# Patient Record
Sex: MALE | Race: BLACK OR AFRICAN AMERICAN | NOT HISPANIC OR LATINO | Employment: FULL TIME | URBAN - METROPOLITAN AREA
[De-identification: names, ages, dates, MRNs, and addresses within clinical notes are randomized per-mention and may not be internally consistent; named-entity substitution may affect disease eponyms.]

---

## 2022-04-09 ENCOUNTER — HISTORICAL (OUTPATIENT)
Dept: ADMINISTRATIVE | Facility: HOSPITAL | Age: 22
End: 2022-04-09
Payer: COMMERCIAL

## 2022-04-25 VITALS
WEIGHT: 172.81 LBS | SYSTOLIC BLOOD PRESSURE: 114 MMHG | BODY MASS INDEX: 25.6 KG/M2 | HEIGHT: 69 IN | DIASTOLIC BLOOD PRESSURE: 76 MMHG | OXYGEN SATURATION: 98 %

## 2022-11-29 ENCOUNTER — OFFICE VISIT (OUTPATIENT)
Dept: URGENT CARE | Facility: CLINIC | Age: 22
End: 2022-11-29

## 2022-11-29 VITALS
HEART RATE: 60 BPM | BODY MASS INDEX: 25.8 KG/M2 | RESPIRATION RATE: 18 BRPM | HEIGHT: 69 IN | TEMPERATURE: 98 F | SYSTOLIC BLOOD PRESSURE: 125 MMHG | WEIGHT: 174.19 LBS | DIASTOLIC BLOOD PRESSURE: 75 MMHG | OXYGEN SATURATION: 98 %

## 2022-11-29 DIAGNOSIS — R00.2 HEART PALPITATIONS: Primary | ICD-10-CM

## 2022-11-29 PROCEDURE — 93010 ELECTROCARDIOGRAM REPORT: CPT | Mod: S$PBB,,,

## 2022-11-29 PROCEDURE — 99213 PR OFFICE/OUTPT VISIT, EST, LEVL III, 20-29 MIN: ICD-10-PCS | Mod: S$PBB,25,,

## 2022-11-29 PROCEDURE — 99215 OFFICE O/P EST HI 40 MIN: CPT | Mod: PBBFAC

## 2022-11-29 PROCEDURE — 93005 ELECTROCARDIOGRAM TRACING: CPT | Mod: PBBFAC

## 2022-11-29 PROCEDURE — 99213 OFFICE O/P EST LOW 20 MIN: CPT | Mod: S$PBB,25,,

## 2022-11-29 PROCEDURE — 93010 POCT EKG 12-LEAD: ICD-10-PCS | Mod: S$PBB,,,

## 2022-11-29 NOTE — PROGRESS NOTES
"Subjective:       Patient ID: Saad Weldon Jr. is a 22 y.o. male.    Vitals:  height is 5' 9.02" (1.753 m) and weight is 79 kg (174 lb 3.2 oz). His oral temperature is 97.8 °F (36.6 °C). His blood pressure is 125/75 and his pulse is 60. His respiration is 18 and oxygen saturation is 98%.     Chief Complaint: Heart Problem (Pt sts he's been having heart palpation. Sts he is not stressed. ), Leg Pain (Right calf area, pt sts been hurting the past couple of months. ), and Referral (For PCP )    PT states intermittent heart palpitations. States hx of similar palpitations in past but no follow up. Denies SOB or chest pain, just abnormal heart beat feeling.   Pt also states intermittent calf pain for months, however states that has resolved. States both palpitations and calf pain get better with exercise.  Does not have a PCP.    Heart Problem    Leg Pain     Constitution: Negative.   HENT: Negative.     Neck: neck negative.   Cardiovascular:  Positive for palpitations.   Eyes: Negative.    Respiratory: Negative.     Gastrointestinal: Negative.    Genitourinary: Negative.    Musculoskeletal: Negative.    Skin: Negative.    Neurological: Negative.      Objective:      Physical Exam   Constitutional: He is oriented to person, place, and time. normal  HENT:   Head: Normocephalic.   Nose: Nose normal.   Mouth/Throat: Uvula is midline, oropharynx is clear and moist and mucous membranes are normal. Mucous membranes are moist. Oropharynx is clear.   Eyes: Pupils are equal, round, and reactive to light.   Neck: Neck supple.   Cardiovascular: Regular rhythm, normal heart sounds and normal pulses. Bradycardia present.   Pulmonary/Chest: Effort normal and breath sounds normal.   Abdominal: Normal appearance. Soft.   Musculoskeletal: Normal range of motion.         General: Normal range of motion.   Neurological: He is alert and oriented to person, place, and time.   Skin: Skin is warm and dry.   Vitals reviewed.    "   Assessment:       1. Heart palpitations            Plan:         Heart palpitations  -     POCT EKG 12-LEAD (Manually Resulted by Ordering Provider)  -     Ambulatory referral/consult to Family Practice      EKG Sinus bradycardia. No ST changes or arrhythmias noted.     A referral placed for a PCP for further work up and evaluation.    ER precautions given, pt verbalized understanding.     Please see provided patient education for guidance.

## 2023-06-18 ENCOUNTER — OFFICE VISIT (OUTPATIENT)
Dept: URGENT CARE | Facility: CLINIC | Age: 23
End: 2023-06-18

## 2023-06-18 VITALS
HEIGHT: 69 IN | RESPIRATION RATE: 16 BRPM | TEMPERATURE: 98 F | SYSTOLIC BLOOD PRESSURE: 120 MMHG | HEART RATE: 94 BPM | WEIGHT: 171.5 LBS | OXYGEN SATURATION: 99 % | BODY MASS INDEX: 25.4 KG/M2 | DIASTOLIC BLOOD PRESSURE: 77 MMHG

## 2023-06-18 DIAGNOSIS — Q38.6 FORDYCE SPOTS: Primary | ICD-10-CM

## 2023-06-18 PROCEDURE — 99213 PR OFFICE/OUTPT VISIT, EST, LEVL III, 20-29 MIN: ICD-10-PCS | Mod: S$PBB,,, | Performed by: FAMILY MEDICINE

## 2023-06-18 PROCEDURE — 99213 OFFICE O/P EST LOW 20 MIN: CPT | Mod: S$PBB,,, | Performed by: FAMILY MEDICINE

## 2023-06-18 PROCEDURE — 99214 OFFICE O/P EST MOD 30 MIN: CPT | Mod: PBBFAC | Performed by: FAMILY MEDICINE

## 2023-06-18 NOTE — PROGRESS NOTES
"Subjective:      Patient ID: Saad Weldon Jr. is a 23 y.o. male.    Vitals:  height is 5' 8.9" (1.75 m) and weight is 77.8 kg (171 lb 8.3 oz). His temperature is 98 °F (36.7 °C). His blood pressure is 120/77 and his pulse is 94. His respiration is 16 and oxygen saturation is 99%.     Chief Complaint: Rash (Upper lip x 1wk, states has "bumps" )    Chronic bumps around lips, no other symptoms.  Denies use of medications.    Rash      Skin:  Positive for rash.    Objective:     Physical Exam   Constitutional: He appears well-developed.  Non-toxic appearance. He does not appear ill. No distress.   HENT:   Head: Atraumatic.   Abdominal: Soft. There is no abdominal tenderness.   Lymphadenopathy:     He has no cervical adenopathy.   Skin: Skin is warm, dry and not diaphoretic. Capillary refill takes less than 2 seconds.         Comments: Gina spots surround the edge of the vermilion border.  Otherwise no skin lesions, no lip lesions, no lip swelling.  Intraoral exam is within normal limits.     Assessment:     1. [place] spots        Plan:   Discussed [place] papules.  Use moisturizers as discussed.  Return to urgent care if need    [place] spots                    "

## 2023-06-27 ENCOUNTER — PATIENT MESSAGE (OUTPATIENT)
Dept: RESEARCH | Facility: HOSPITAL | Age: 23
End: 2023-06-27

## 2023-08-08 ENCOUNTER — OFFICE VISIT (OUTPATIENT)
Dept: URGENT CARE | Facility: CLINIC | Age: 23
End: 2023-08-08

## 2023-08-08 VITALS
DIASTOLIC BLOOD PRESSURE: 75 MMHG | HEIGHT: 68 IN | RESPIRATION RATE: 16 BRPM | WEIGHT: 179.69 LBS | OXYGEN SATURATION: 98 % | TEMPERATURE: 98 F | HEART RATE: 60 BPM | BODY MASS INDEX: 27.23 KG/M2 | SYSTOLIC BLOOD PRESSURE: 121 MMHG

## 2023-08-08 DIAGNOSIS — J30.9 ALLERGIC RHINITIS, UNSPECIFIED SEASONALITY, UNSPECIFIED TRIGGER: Primary | ICD-10-CM

## 2023-08-08 DIAGNOSIS — Z76.89 REFERRAL OF PATIENT: ICD-10-CM

## 2023-08-08 DIAGNOSIS — B35.1 ONYCHOMYCOSIS OF GREAT TOE: ICD-10-CM

## 2023-08-08 LAB
CTP QC/QA: YES
S PYO RRNA THROAT QL PROBE: NEGATIVE

## 2023-08-08 PROCEDURE — 87880 STREP A ASSAY W/OPTIC: CPT | Mod: PBBFAC

## 2023-08-08 PROCEDURE — 99213 OFFICE O/P EST LOW 20 MIN: CPT | Mod: S$PBB,,,

## 2023-08-08 PROCEDURE — 99213 PR OFFICE/OUTPT VISIT, EST, LEVL III, 20-29 MIN: ICD-10-PCS | Mod: S$PBB,,,

## 2023-08-08 PROCEDURE — 99214 OFFICE O/P EST MOD 30 MIN: CPT | Mod: PBBFAC

## 2023-08-08 RX ORDER — CETIRIZINE HYDROCHLORIDE 10 MG/1
10 TABLET ORAL DAILY
Qty: 30 TABLET | Refills: 0 | Status: SHIPPED | OUTPATIENT
Start: 2023-08-08 | End: 2023-09-11 | Stop reason: SDUPTHER

## 2023-08-08 RX ORDER — FLUTICASONE PROPIONATE 50 MCG
2 SPRAY, SUSPENSION (ML) NASAL DAILY
Qty: 16 G | Refills: 0 | Status: SHIPPED | OUTPATIENT
Start: 2023-08-08 | End: 2023-09-11 | Stop reason: SDUPTHER

## 2023-08-08 NOTE — PATIENT INSTRUCTIONS
Keep toenails clean and dry. Flonase and cetrizine for nasal congestion and allergy symptomsTylenol or ibuprofen for any discomfort. Referral for PCP sent. Keep appointment. Go to the nearest ER for worsening symptoms, such as chest pain, shortness of breath, palpitations, etc.

## 2023-08-08 NOTE — PROGRESS NOTES
"Subjective:      Patient ID: Saad Weldon Jr. is a 23 y.o. male.    Vitals:  height is 5' 8" (1.727 m) and weight is 81.5 kg (179 lb 11.2 oz). His temperature is 98.2 °F (36.8 °C). His blood pressure is 121/75 and his pulse is 60. His respiration is 16 and oxygen saturation is 98%.     Chief Complaint: Dry throat  (X 1 week. Denies concern for Strep. Needs PCP referral.) and Referral    Cc as above.     Sore Throat   This is a new problem. The current episode started in the past 7 days. The problem has been unchanged. There has been no fever. The pain is mild. Associated symptoms include congestion and trouble swallowing. Pertinent negatives include no coughing or shortness of breath. He has tried nothing for the symptoms.       HENT:  Positive for congestion, sore throat and trouble swallowing.    Respiratory:  Negative for cough and shortness of breath.       Objective:     Physical Exam   Constitutional: He is oriented to person, place, and time. He appears well-developed. He is cooperative.  Non-toxic appearance. He does not appear ill. No distress. normal  HENT:   Head: Normocephalic and atraumatic.   Ears:   Right Ear: Hearing, tympanic membrane, external ear and ear canal normal.   Left Ear: Hearing, tympanic membrane, external ear and ear canal normal.   Nose: Mucosal edema, rhinorrhea and congestion present. No nasal deformity. No epistaxis. Right sinus exhibits no maxillary sinus tenderness and no frontal sinus tenderness. Left sinus exhibits no maxillary sinus tenderness and no frontal sinus tenderness.   Mouth/Throat: Uvula is midline, oropharynx is clear and moist and mucous membranes are normal. Mucous membranes are moist. No trismus in the jaw. Normal dentition. No uvula swelling. No oropharyngeal exudate, posterior oropharyngeal edema or posterior oropharyngeal erythema. Oropharynx is clear.   Postnasal drip      Comments: Postnasal drip  Eyes: Conjunctivae and lids are normal. No scleral " icterus.   Neck: Trachea normal and phonation normal. Neck supple. No edema present. No erythema present. No neck rigidity present.   Cardiovascular: Normal rate, regular rhythm, normal heart sounds and normal pulses.   Pulmonary/Chest: Effort normal and breath sounds normal. No respiratory distress. He has no decreased breath sounds. He has no rhonchi.   Abdominal: Normal appearance.   Musculoskeletal: Normal range of motion.         General: No deformity. Normal range of motion.   Lymphadenopathy:     He has no cervical adenopathy.   Neurological: no focal deficit. He is alert and oriented to person, place, and time. He exhibits normal muscle tone. Coordination normal.   Skin: Skin is warm, dry, intact, not diaphoretic and not pale.         Comments: Right great toenail-discolored   Psychiatric: His speech is normal and behavior is normal. Mood and judgment normal.   Nursing note and vitals reviewed.      Assessment:     1. Allergic rhinitis, unspecified seasonality, unspecified trigger    2. Onychomycosis of great toe    3. Referral of patient        Plan:       Allergic rhinitis, unspecified seasonality, unspecified trigger  -     Cancel: POCT Strep A, Molecular  -     fluticasone propionate (FLONASE) 50 mcg/actuation nasal spray; 2 sprays (100 mcg total) by Each Nostril route once daily. Nasal congestion  Dispense: 16 g; Refill: 0  -     cetirizine (ZYRTEC) 10 MG tablet; Take 1 tablet (10 mg total) by mouth once daily.  Dispense: 30 tablet; Refill: 0  -     POCT rapid strep A    Onychomycosis of great toe    Referral of patient  -     Ambulatory referral/consult to Internal Medicine    Keep toenails clean and dry. Flonase and cetrizine for nasal congestion and allergy symptoms. Tylenol or ibuprofen for any discomfort. Referral for PCP sent. Keep appointment. ER precautions provided.

## 2023-09-11 ENCOUNTER — OFFICE VISIT (OUTPATIENT)
Dept: INTERNAL MEDICINE | Facility: CLINIC | Age: 23
End: 2023-09-11

## 2023-09-11 VITALS
HEIGHT: 68 IN | RESPIRATION RATE: 16 BRPM | TEMPERATURE: 99 F | DIASTOLIC BLOOD PRESSURE: 68 MMHG | HEART RATE: 62 BPM | SYSTOLIC BLOOD PRESSURE: 115 MMHG | WEIGHT: 187 LBS | BODY MASS INDEX: 28.34 KG/M2

## 2023-09-11 DIAGNOSIS — J30.9 ALLERGIC RHINITIS, UNSPECIFIED SEASONALITY, UNSPECIFIED TRIGGER: ICD-10-CM

## 2023-09-11 DIAGNOSIS — Z11.4 SCREENING FOR HIV (HUMAN IMMUNODEFICIENCY VIRUS): ICD-10-CM

## 2023-09-11 DIAGNOSIS — Z00.00 WELLNESS EXAMINATION: Primary | ICD-10-CM

## 2023-09-11 DIAGNOSIS — Z11.3 SCREENING EXAMINATION FOR STD (SEXUALLY TRANSMITTED DISEASE): ICD-10-CM

## 2023-09-11 PROCEDURE — 99395 PREV VISIT EST AGE 18-39: CPT | Mod: S$PBB,,, | Performed by: NURSE PRACTITIONER

## 2023-09-11 PROCEDURE — 99212 OFFICE O/P EST SF 10 MIN: CPT | Mod: 25,S$PBB,, | Performed by: NURSE PRACTITIONER

## 2023-09-11 PROCEDURE — 99395 PR PREVENTIVE VISIT,EST,18-39: ICD-10-PCS | Mod: S$PBB,,, | Performed by: NURSE PRACTITIONER

## 2023-09-11 PROCEDURE — 99213 OFFICE O/P EST LOW 20 MIN: CPT | Mod: PBBFAC | Performed by: NURSE PRACTITIONER

## 2023-09-11 PROCEDURE — 99212 PR OFFICE/OUTPT VISIT, EST, LEVL II, 10-19 MIN: ICD-10-PCS | Mod: 25,S$PBB,, | Performed by: NURSE PRACTITIONER

## 2023-09-11 RX ORDER — CETIRIZINE HYDROCHLORIDE 10 MG/1
10 TABLET ORAL DAILY
Qty: 90 TABLET | Refills: 3 | Status: SHIPPED | OUTPATIENT
Start: 2023-09-11 | End: 2024-09-10

## 2023-09-11 RX ORDER — FLUTICASONE PROPIONATE 50 MCG
2 SPRAY, SUSPENSION (ML) NASAL DAILY
Qty: 16 G | Refills: 11 | Status: SHIPPED | OUTPATIENT
Start: 2023-09-11 | End: 2024-09-10

## 2023-09-11 NOTE — PROGRESS NOTES
NAHID Ortiz   [facility]  [address] [facility] [address]      PATIENT NAME: Saad Weldon Jr.  : 2000  DATE: 23  MRN: [id number]      Patient PCP Information       Provider PCP Type    NAHID Ortiz [facility]            Reason for Visit / Chief Complaint: Establish Care       History of Present Illness / Problem Focused Workflow     Saad Weldon Jr. presents to the clinic with Establish Care     22 yo AAM here to establish care.     STI Screening - 23  Wellness - 23  Pt here today to establish care with routine wellness OV. Pt is agreeable to routine labs today after the visit, will f/u in the portal with lab results. Pt reports he was told he had a hx of depression but reports he does not believe that and has no s/s of depression at this time. Will monitor and f/u if needed. Pt with hx of allergies, would like zyrtec and flonase for daily prn use, will send new RX today. Denies any other issues at this time to address.   Denies chest pain, shortness of breath, cough, fever, headache, dizziness, weakness, abdominal pain, nausea, vomiting, diarrhea, constipation, black/bloody stools, unplanned weight loss, night sweats, changes in urinary patterns, burning/odor with urination, depression, anxiety, and SI/HI.             Review of Systems     Review of Systems   Constitutional: Negative.    HENT: Negative.     Eyes: Negative.    Respiratory: Negative.     Cardiovascular: Negative.    Gastrointestinal: Negative.    Endocrine: Negative.    Genitourinary: Negative.    Neurological: Negative.    Psychiatric/Behavioral: Negative.           Medications and Allergies     Medications  Current Outpatient Medications   Medication Instructions    cetirizine (ZYRTEC) 10 mg, Oral, Daily, For Allergies    fluticasone propionate (FLONASE) 100 mcg, Each Nostril, Daily, Nasal congestion      "    Allergies  Review of patient's allergies indicates:   Allergen Reactions    Amoxicillin Anaphylaxis    Shellfish containing products      Other reaction(s): swelling       Physical Examination     Visit Vitals  /68   Pulse 62   Temp 98.5 °F (36.9 °C)   Resp 16   Ht 5' 8" (1.727 m)   Wt 84.8 kg (187 lb)   BMI 28.43 kg/m²       Physical Exam  Vitals reviewed.   Constitutional:       Appearance: Normal appearance. He is normal weight.   HENT:      Head: Normocephalic.   Cardiovascular:      Rate and Rhythm: Normal rate and regular rhythm.      Pulses: Normal pulses.      Heart sounds: Normal heart sounds.   Pulmonary:      Effort: Pulmonary effort is normal.      Breath sounds: Normal breath sounds.   Abdominal:      General: Abdomen is flat.      Palpations: Abdomen is soft.   Musculoskeletal:         General: Normal range of motion.      Cervical back: Normal range of motion.   Skin:     General: Skin is warm and dry.   Neurological:      Mental Status: He is alert.   Psychiatric:         Mood and Affect: Mood normal.           Results     Assessment        ICD-10-CM ICD-9-CM   1. Wellness examination  Z00.00 V70.0   2. Allergic rhinitis, unspecified seasonality, unspecified trigger  J30.9 477.9   3. Screening for HIV (human immunodeficiency virus)  Z11.4 V73.89   4. Screening examination for STD (sexually transmitted disease)  Z11.3 V74.5        Plan      Problem List Items Addressed This Visit          ENT    Allergic rhinitis    Overview     OTC antihistamines as needed (i.e. Benadryl)  Increase PO Fluids  Avoid/limit triggers such as pollen, dust, molds, and pet dander.   Avoid smoke, strong chemicals, cleaning products, perfumes/colognes.           Current Assessment & Plan     Start Zyrtec daily and flonase daily prn  F/U PRN          Relevant Medications    cetirizine (ZYRTEC) 10 MG tablet    fluticasone propionate (FLONASE) 50 mcg/actuation nasal spray       Other    Wellness examination - Primary "    Current Assessment & Plan     Pt wellness visit completed today with appropriate lab work.            Relevant Orders    Hemoglobin A1C    Comprehensive Metabolic Panel    Urinalysis, Reflex to Urine Culture    Lipid Panel    CBC Auto Differential     Other Visit Diagnoses       Screening for HIV (human immunodeficiency virus)        Relevant Orders    HIV 1/2 Ag/Ab (4th Gen)    Screening examination for STD (sexually transmitted disease)        Relevant Orders    Hepatitis C Antibody    Chlamydia/GC, PCR    SYPHILIS ANTIBODY (WITH REFLEX RPR)            Future Appointments   Date Time Provider Department Center   9/23/2024  8:45 AM Tena Baker, NA[facility]ID [facility]        Follow up in about 54 weeks (around 9/23/2024) for Wellness.      Signature:     [facility]  [address] [id number]    Date of encounter: 9/11/23

## 2023-09-12 ENCOUNTER — TELEPHONE (OUTPATIENT)
Dept: INTERNAL MEDICINE | Facility: CLINIC | Age: 23
End: 2023-09-12

## 2023-09-12 ENCOUNTER — PATIENT MESSAGE (OUTPATIENT)
Dept: INTERNAL MEDICINE | Facility: CLINIC | Age: 23
End: 2023-09-12

## 2023-12-11 PROBLEM — Z00.00 WELLNESS EXAMINATION: Status: RESOLVED | Noted: 2023-09-11 | Resolved: 2023-12-11

## 2024-04-09 ENCOUNTER — NURSE TRIAGE (OUTPATIENT)
Dept: ADMINISTRATIVE | Facility: CLINIC | Age: 24
End: 2024-04-09

## 2024-04-09 ENCOUNTER — HOSPITAL ENCOUNTER (EMERGENCY)
Facility: HOSPITAL | Age: 24
Discharge: HOME | End: 2024-04-09
Attending: EMERGENCY MEDICINE

## 2024-04-09 VITALS
HEART RATE: 58 BPM | TEMPERATURE: 99 F | OXYGEN SATURATION: 98 % | SYSTOLIC BLOOD PRESSURE: 147 MMHG | HEIGHT: 68 IN | BODY MASS INDEX: 27.28 KG/M2 | DIASTOLIC BLOOD PRESSURE: 72 MMHG | RESPIRATION RATE: 20 BRPM | WEIGHT: 180 LBS

## 2024-04-09 DIAGNOSIS — K04.7 DENTAL ABSCESS: Primary | ICD-10-CM

## 2024-04-09 PROCEDURE — 99283 EMERGENCY DEPT VISIT LOW MDM: CPT

## 2024-04-09 RX ORDER — CLINDAMYCIN HYDROCHLORIDE 150 MG/1
300 CAPSULE ORAL 4 TIMES DAILY
Qty: 80 CAPSULE | Refills: 0 | Status: SHIPPED | OUTPATIENT
Start: 2024-04-09 | End: 2024-04-19

## 2024-04-10 NOTE — ED PROVIDER NOTES
Encounter Date: 4/9/2024       History     Chief Complaint   Patient presents with    Mouth Lesions     Painless oral lesion on right lower gum line x 2 months, intermittently bleeds, denies pain. Pt reports lesion has disappeared up to one week but returns. Saw dentist 2 days ago, dentist told pt lesion is from an infected tooth and would need root canal, dentist was supposed to call in antibiotics for patient.      See MDM    The history is provided by the patient. No  was used.     Review of patient's allergies indicates:   Allergen Reactions    Amoxicillin Anaphylaxis    Shellfish containing products      Other reaction(s): swelling     Past Medical History:   Diagnosis Date    Depression      Past Surgical History:   Procedure Laterality Date    TONSILLECTOMY       Family History   Problem Relation Age of Onset    No Known Problems Mother     No Known Problems Father      Social History     Tobacco Use    Smoking status: Former     Types: Vaping with nicotine    Smokeless tobacco: Never   Substance Use Topics    Alcohol use: Yes     Alcohol/week: 4.0 standard drinks of alcohol     Types: 4 Cans of beer per week     Comment: occ    Drug use: Not Currently     Frequency: 7.0 times per week     Types: Marijuana     Review of Systems   Constitutional:  Negative for fever.   HENT:  Positive for dental problem.    Respiratory:  Negative for cough and shortness of breath.    Cardiovascular:  Negative for chest pain.   Gastrointestinal:  Negative for abdominal pain.   Genitourinary:  Negative for difficulty urinating and dysuria.   Musculoskeletal:  Negative for gait problem.   Skin:  Negative for color change.   Neurological:  Negative for dizziness, speech difficulty and headaches.   Psychiatric/Behavioral:  Negative for hallucinations and suicidal ideas.    All other systems reviewed and are negative.      Physical Exam     Initial Vitals [04/09/24 2211]   BP Pulse Resp Temp SpO2   (!) 147/72 (!)  58 20 98.7 °F (37.1 °C) 98 %      MAP       --         Physical Exam    Nursing note and vitals reviewed.  Constitutional: He appears well-developed and well-nourished.   HENT:   Head: Normocephalic.   Redness and swelling to the right lower gumline   Eyes: EOM are normal.   Neck: Neck supple.   Normal range of motion.  Cardiovascular:  Normal rate, regular rhythm, normal heart sounds and intact distal pulses.           Pulmonary/Chest: Breath sounds normal.   Abdominal: Abdomen is soft. Bowel sounds are normal.   Musculoskeletal:         General: Normal range of motion.      Cervical back: Normal range of motion and neck supple.     Neurological: He is alert and oriented to person, place, and time. He has normal strength.   Skin: Skin is warm and dry. Capillary refill takes less than 2 seconds.   Psychiatric: He has a normal mood and affect. His behavior is normal. Judgment and thought content normal.         ED Course   Procedures  Labs Reviewed - No data to display       Imaging Results    None          Medications - No data to display  Medical Decision Making  Historian:  Patient.  Patient is a 24-year-old male  that presents with swelling to the lower right gum that has been present proximally 1 week. Associated symptoms nothing. Surrounding information is seen by Dentistry in has an infection in his to. Exacerbated by nothing. Relieved by nothing. Patient treatment prior to arrival none. Risk factors include none. Other history pertaining to this complaint nothing.   Assessment:  See physical exam.  DD:  Aphthous ulcer, dental abscess, dental caries  ED Course: History was obtained.  Physical was performed.  Patient appears to have the starting of a dental abscess.  I will put him on clindamycin.  I recommended he follows back up with Dentistry. Medical or surgical consults:  None. Social determinants that affect healthcare:  None.       Risk  Prescription drug management.  Risk Details: Clindamycin                                       Clinical Impression:  Final diagnoses:  [K04.7] Dental abscess (Primary)          ED Disposition Condition    Discharge Stable          ED Prescriptions       Medication Sig Dispense Start Date End Date Auth. Provider    clindamycin (CLEOCIN) 150 MG capsule Take 2 capsules (300 mg total) by mouth 4 (four) times daily. for 10 days 80 capsule 4/9/2024 4/19/2024 Reed Little FNP          Follow-up Information       Follow up With Specialties Details Why Contact Info    Your Primary Care Provider  Call in 3 days ed follow up              Reed Little FNP  04/09/24 1160

## 2024-04-10 NOTE — TELEPHONE ENCOUNTER
"Caller states he has "pimple" to gum. Caller c/o swelling to area and draiage of blood and pus from effected area. Caller denies pain at this time.  Pt advised per protocol and verbalized understanding.   Reason for Disposition   [1] Bleeding from mouth AND [2] won't stop after 10 minutes of direct pressure    Additional Information   Negative: SEVERE difficulty breathing (e.g., struggling for each breath, speaks in single words, stridor)   Negative: Sounds like a life-threatening emergency to the triager   Negative: [1] Drooling or spitting out saliva (because can't swallow) AND [2] new-onset    Protocols used: Mouth Symptoms-A-AH    "

## 2024-06-30 ENCOUNTER — HOSPITAL ENCOUNTER (EMERGENCY)
Facility: HOSPITAL | Age: 24
Discharge: HOME | End: 2024-06-30

## 2024-06-30 VITALS
WEIGHT: 180 LBS | OXYGEN SATURATION: 98 % | TEMPERATURE: 98 F | DIASTOLIC BLOOD PRESSURE: 73 MMHG | HEART RATE: 94 BPM | BODY MASS INDEX: 27.37 KG/M2 | RESPIRATION RATE: 15 BRPM | SYSTOLIC BLOOD PRESSURE: 121 MMHG

## 2024-06-30 DIAGNOSIS — M79.604 RIGHT LEG PAIN: Primary | ICD-10-CM

## 2024-06-30 DIAGNOSIS — R60.9 SWELLING: ICD-10-CM

## 2024-06-30 PROCEDURE — 99283 EMERGENCY DEPT VISIT LOW MDM: CPT | Mod: 25

## 2024-06-30 NOTE — ED PROVIDER NOTES
Encounter Date: 6/30/2024       History     Chief Complaint   Patient presents with    Leg Pain     Right ankle/leg pain x few mos, denies recent trauma. Pt ambulating from triage waiting without difficulty.      SEE MDM        Review of patient's allergies indicates:   Allergen Reactions    Amoxicillin Anaphylaxis    Shellfish containing products      Other reaction(s): swelling     Past Medical History:   Diagnosis Date    Depression      Past Surgical History:   Procedure Laterality Date    TONSILLECTOMY       Family History   Problem Relation Name Age of Onset    No Known Problems Mother      No Known Problems Father       Social History     Tobacco Use    Smoking status: Former     Types: Vaping with nicotine    Smokeless tobacco: Never   Substance Use Topics    Alcohol use: Yes     Alcohol/week: 4.0 standard drinks of alcohol     Types: 4 Cans of beer per week     Comment: occ    Drug use: Not Currently     Frequency: 7.0 times per week     Types: Marijuana     Review of Systems   Musculoskeletal:  Positive for joint swelling.   All other systems reviewed and are negative.      Physical Exam     Initial Vitals [06/30/24 0953]   BP Pulse Resp Temp SpO2   121/73 94 15 97.9 °F (36.6 °C) 98 %      MAP       --         Physical Exam    Constitutional: He appears well-developed and well-nourished.   HENT:   Head: Normocephalic and atraumatic.   Eyes: EOM are normal. Pupils are equal, round, and reactive to light.   Neck: Neck supple.   Normal range of motion.  Cardiovascular:  Normal rate, regular rhythm and normal heart sounds.           Pulmonary/Chest: Breath sounds normal.   Musculoskeletal:      Cervical back: Normal range of motion and neck supple.      Right lower leg: Swelling present. No deformity, lacerations or tenderness.        Legs:       Comments: Mild swelling noted to right tibia. All other adjacent joints WNL. + dorsal pedis pulse noted to right foot.           ED Course   Procedures  Labs Reviewed  - No data to display       Imaging Results              X-Ray Tibia Fibula 2 View Right (Final result)  Result time 06/30/24 10:34:50   Procedure changed from X-Ray Ankle Complete Right     Final result by Pete Adame MD (06/30/24 10:34:50)                   Impression:      As above.  Osseous lesion appears nonaggressive.  Recommend further evaluation or comparison to prior imaging.      Electronically signed by: Pete Adame  Date:    06/30/2024  Time:    10:34               Narrative:    EXAMINATION:  XR TIBIA FIBULA 2 VIEW RIGHT    CLINICAL HISTORY:  radiating pain, swelling;  Edema, unspecified    TECHNIQUE:  Two views of the right tibia and fibula.    COMPARISON:  No prior imaging available for comparison    FINDINGS:  Nonaggressive appearing osseous fusion of the distal tibia and fibula with cortical irregularity of the distal fibula.  Recommend comparison to prior imaging for stability.  Further evaluation may be obtained with orthopedics or MR.                                       Medications - No data to display  Medical Decision Making  The patient is a 24 y.o. male with no medical history  who presents to  Emergency Department with a chief complaint of intermittent right lower leg swelling and pain .  Symptoms began  3- 4 months  and have been coming and going  since onset of his pain. His  pain is currently rated as a 0/10 in severity and described as nothing at the moment  with no radiation. Associated symptoms include pain with swelling .Symptoms are aggravated with ambulating and there are no alleviating factors. . He reports taking nothing  prior to arrival with  relief of symptoms. No other reported symptoms at this time . Unable to recall any injury    History obtained by patient.  Differential diagnosis consist of strain, fracture or sprain    Amount and/or Complexity of Data Reviewed  Radiology: ordered. Decision-making details documented in ED Course.  Discussion of management or test  interpretation with external provider(s): Imaging obtained on today demonstrated Nonaggressive appearing osseous fusion of the distal tibia and fibula with cortical irregularity of the distal fibula. Patient unable to recall any injury in the past. Advise patient a internal referral will place to [facility] for out patient MRI and further work. Patient currently denies any pain at this time. All questions and concerns have been answered PTA.    Critical Care  Total time providing critical care: minutes (NONE)                                      Clinical Impression:  Final diagnoses:  [R60.9] Swelling  [M79.604] Right leg pain (Primary)          ED Disposition Condition    Discharge Stable          ED Prescriptions    None       Follow-up Information       Follow up With Specialties Details Why Contact Info    Tena Baker, [facility]   [address] [facility] [address] [phone]               Areli Slaughter FNP  06/30/24 9706

## 2024-06-30 NOTE — FIRST PROVIDER EVALUATION
Medical screening examination initiated.  I have conducted a focused provider triage encounter, findings are as follows:    Brief history of present illness:  25y/o M presents to the ED with right ankle swelling. States pain started a few months ago.     There were no vitals filed for this visit.    Pertinent physical exam:  AAA x 3    Brief workup plan:  Imaging    Preliminary workup initiated; this workup will be continued and followed by the physician or advanced practice provider that is assigned to the patient when roomed.

## 2024-06-30 NOTE — DISCHARGE INSTRUCTIONS
Thanks for letting us take care of you today!  It is our goal to give you courteous care and to keep you comfortable and informed, if you have any questions before you leave I will be happy to try and answer them.    Here is some advice after your visit:      Your visit in the emergency department is NOT definitive care - please follow-up with your primary care doctor and/or specialist within 1-2 days.  Please return if you have any worsening in your condition or if you have any other concerns.    A referral has been placed to [facility] for follow up. Someone should contact you with the next appointment date and time.

## 2024-07-16 ENCOUNTER — OFFICE VISIT (OUTPATIENT)
Dept: ORTHOPEDICS | Facility: CLINIC | Age: 24
End: 2024-07-16

## 2024-07-16 VITALS
HEIGHT: 68 IN | BODY MASS INDEX: 27.76 KG/M2 | DIASTOLIC BLOOD PRESSURE: 79 MMHG | SYSTOLIC BLOOD PRESSURE: 124 MMHG | HEART RATE: 75 BPM | TEMPERATURE: 98 F | WEIGHT: 183.19 LBS

## 2024-07-16 DIAGNOSIS — Q78.8: Primary | ICD-10-CM

## 2024-07-16 PROCEDURE — 99213 OFFICE O/P EST LOW 20 MIN: CPT | Mod: PBBFAC

## 2024-07-16 PROCEDURE — 99202 OFFICE O/P NEW SF 15 MIN: CPT | Mod: S$PBB,,, | Performed by: PHYSICIAN ASSISTANT

## 2024-07-16 NOTE — PROGRESS NOTES
Chief Complaint:   Chief Complaint   Patient presents with    Right Lower Leg - Pain, Swelling     Pain has been there for about a  year and big toe is discolored.  Wearing a brace which does help.        History of present illness:    24-year-old male presents office today for evaluation his right lower leg pain.  This has present for nearly 1 year without any associated injury.  He notes pain and swelling to the right lower extremity, mid shin after being on his feet.  He notes discomfort with increased activity.  Rest and elevation does improve the pain.  He denies any numbness or tingling.  He wears a compressive brace which helps as well.  He denies any hip or knee pain.    Past Medical History:   Diagnosis Date    Depression        Past Surgical History:   Procedure Laterality Date    TONSILLECTOMY         Current Outpatient Medications   Medication Sig    cetirizine (ZYRTEC) 10 MG tablet Take 1 tablet (10 mg total) by mouth once daily. For Allergies (Patient not taking: Reported on 7/16/2024)    fluticasone propionate (FLONASE) 50 mcg/actuation nasal spray 2 sprays (100 mcg total) by Each Nostril route once daily. Nasal congestion (Patient not taking: Reported on 7/16/2024)     No current facility-administered medications for this visit.       Review of patient's allergies indicates:   Allergen Reactions    Amoxicillin Anaphylaxis    Shellfish containing products      Other reaction(s): swelling       Family History   Problem Relation Name Age of Onset    No Known Problems Mother      No Known Problems Father         Social History     Socioeconomic History    Marital status: Single   Occupational History    Occupation: detail cars   Tobacco Use    Smoking status: Some Days     Types: Vaping with nicotine    Smokeless tobacco: Never   Substance and Sexual Activity    Alcohol use: Yes     Alcohol/week: 4.0 standard drinks of alcohol     Types: 4 Cans of beer per week     Comment: occ    Drug use: Not  Currently     Frequency: 7.0 times per week     Types: Marijuana    Sexual activity: Yes     Partners: Female     Birth control/protection: Condom     Social Determinants of Health     Financial Resource Strain: Low Risk  (9/11/2023)    Overall Financial Resource Strain (CARDIA)     Difficulty of Paying Living Expenses: Not hard at all   Food Insecurity: No Food Insecurity (9/11/2023)    Hunger Vital Sign     Worried About Running Out of Food in the Last Year: Never true     Ran Out of Food in the Last Year: Never true   Transportation Needs: No Transportation Needs (9/11/2023)    PRAPARE - Transportation     Lack of Transportation (Medical): No     Lack of Transportation (Non-Medical): No   Physical Activity: Sufficiently Active (9/11/2023)    Exercise Vital Sign     Days of Exercise per Week: 4 days     Minutes of Exercise per Session: 80 min   Stress: No Stress Concern Present (9/11/2023)    [employer] [place] [employer] - Occupational Stress Questionnaire     Feeling of Stress : Not at all   Housing Stability: Low Risk  (9/11/2023)    Housing Stability Vital Sign     Unable to Pay for Housing in the Last Year: No     Number of Places Lived in the Last Year: 1     Unstable Housing in the Last Year: No         Review of Systems:    Constitution:   Denies chills, fever, and sweats.  HENT:   Denies headaches or blurry vision.  Cardiovascular:  Denies chest pain or irregular heart beat.  Respiratory:   Denies cough or shortness of breath.  Gastrointestinal:  Denies abdominal pain, nausea, or vomiting.  Musculoskeletal:   Denies muscle cramps.  Neurological:   Denies dizziness or focal weakness.  Psychiatric/Behavior: Normal mental status.  Hematology/Lymph:  Denies bleeding problem or easy bruising/bleeding.  Skin:    Denies rash or suspicious lesions.    Examination:    Vital Signs:    Vitals:    07/16/24 1327 07/16/24 1329   BP: 124/79    Pulse: 75    Temp: 98 °F (36.7 °C)    Weight: 83.1 kg (183 lb 3.2  "oz)    Height: 5' 8" (1.727 m)    PainSc:    4       Body mass index is 27.86 kg/m².    Constitution:   Well-developed, well nourished patient in no acute distress.  Neurological:   Alert and oriented x 3 and cooperative to examination.     Psychiatric/Behavior: Normal mental status.  Respiratory:   No shortness of breath.  Nonlabored breathing  Cardiovascular:           Regular rate and rhythm  Eyes:    Extraoccular muscles intact  Skin:    No scars, rash or suspicious lesions.    Physical Exam:     Right lower extremity exam   There is no swelling, erythema or increased heat   Does have some tenderness over the mid to distal fibula and tibia as well as its junction   Intact full pain-free passive and active range of motion of the right knee   He has intact dorsiflexion, plantar flexion, eversion and inversion against resistance the right ankle   He is some discomfort with external rotation of the right ankle   Sensation intact distally   Pedal pulses 2+   Right tibia fibular radiographs reviewed showing nonaggressive appearing osseous fusion of the distal tibia and fibula with cortical irregularity of the distal fibula          Assessment:     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  He has a tibia fibular synostosis.  This has possibly congenital versus traumatic in nature.  He denies any type of injury 1 year ago around the time his symptoms gradually progress.  It is to a point where it is affecting him daily with normal activity and activities of daily living.  I recommend MRI of the right tib fib to further evaluate the osseous fusion and differentiate from any neoplastic process and any relation to the neurovascular structures.  I would like him to follow up with the orthopedic residents " for further treatment discussions following MRI test results.        No follow-ups on file.    DISCLAIMER: This note may have been dictated using voice recognition software and may contain grammatical errors.

## 2024-10-17 ENCOUNTER — HOSPITAL ENCOUNTER (OUTPATIENT)
Dept: RADIOLOGY | Facility: HOSPITAL | Age: 24
Discharge: HOME | End: 2024-10-17
Attending: PHYSICIAN ASSISTANT
Payer: COMMERCIAL

## 2024-10-17 DIAGNOSIS — Q78.8: ICD-10-CM

## 2024-10-17 PROCEDURE — 73718 MRI LOWER EXTREMITY W/O DYE: CPT | Mod: TC,RT

## 2024-10-18 ENCOUNTER — TELEPHONE (OUTPATIENT)
Dept: INTERNAL MEDICINE | Facility: CLINIC | Age: 24
End: 2024-10-18
Payer: COMMERCIAL

## 2024-10-18 ENCOUNTER — TELEPHONE (OUTPATIENT)
Dept: ORTHOPEDICS | Facility: CLINIC | Age: 24
End: 2024-10-18
Payer: COMMERCIAL

## 2024-10-18 NOTE — TELEPHONE ENCOUNTER
Spoke to pt . Informed him of PCP's message below. Pt verbalized understanding. Offered pt to schedule appt with PCP , Pt stated he will return call to [facility] to schedule .

## 2024-10-18 NOTE — TELEPHONE ENCOUNTER
----- Message from Candelario sent at 10/18/2024  9:15 AM CDT -----  .Who Called: Saad Weldon Jr.    Caller is requesting information on test results.    Name of Test (Lab/Mammo/Etc): MRI  Date of Test: 10/17/24  Where the test was performed:   Ordering Provider:     Preferred Method of Contact: Phone Call  Patient's Preferred Phone Number on File: [phone]   Best Call Back Number, if different:  Additional Information:

## 2024-10-18 NOTE — TELEPHONE ENCOUNTER
Can advise pt that he will need to discuss the MRI results with the ordering provider.     Pt not seen in this clinic > 1 year, can reschedule yearly visit.     Thanks,    NAHID Atkins

## 2024-10-21 ENCOUNTER — CLINICAL SUPPORT (OUTPATIENT)
Dept: ORTHOPEDICS | Facility: CLINIC | Age: 24
End: 2024-10-21
Payer: COMMERCIAL

## 2024-10-21 VITALS
HEIGHT: 68 IN | BODY MASS INDEX: 27.76 KG/M2 | DIASTOLIC BLOOD PRESSURE: 77 MMHG | SYSTOLIC BLOOD PRESSURE: 116 MMHG | WEIGHT: 183.19 LBS | HEART RATE: 52 BPM

## 2024-10-21 DIAGNOSIS — Q78.8: Primary | ICD-10-CM

## 2024-10-21 DIAGNOSIS — M89.9 BONE LESION: ICD-10-CM

## 2024-10-21 PROCEDURE — 99213 OFFICE O/P EST LOW 20 MIN: CPT | Mod: PBBFAC

## 2024-10-21 RX ORDER — IBUPROFEN 800 MG/1
800 TABLET ORAL EVERY 6 HOURS PRN
COMMUNITY
Start: 2024-07-16

## 2024-10-21 NOTE — PROGRESS NOTES
[facility]  Established Patient Office Visit  10/21/2024       Patient ID: Saad Weldon Jr.  YOB: 2000  MRN: [id number]    Chief Complaint: Pain of the Right Lower Leg (MRI done on 10/17/24 of right tib fib.)      HPI:  Saad Weldon Jr. is a 24 y.o. male who presents for evaluation of right distal tibia bone lesion.  He reports he 1st started experiencing pain to his right leg about 1 year ago.  He noticed a bony prominence over his distal leg which has increased in size over the past year.  He was evaluated by other provider and an MRI of his right tibia/fibula was ordered.  Denies any recent weight loss or gain, fevers, night sweats.  He denies any family history of similar bony lesion or cancer.  He works as a salesman.  He reports that he experiences pain over the bony prominence with increased activity.  No pain at rest.  No increased pain at night.  He denies any known injury to his right lower extremity prior to the onset of pain and symptoms.    12 point ROS performed and negative except as above.     Past Medical History:    Past Medical History:   Diagnosis Date    Depression      Past Surgical History:   Procedure Laterality Date    TONSILLECTOMY       Family History   Problem Relation Name Age of Onset    No Known Problems Mother      No Known Problems Father       Social History     Socioeconomic History    Marital status: Single   Occupational History    Occupation: detail cars   Tobacco Use    Smoking status: Former     Types: Vaping with nicotine    Smokeless tobacco: Never   Substance and Sexual Activity    Alcohol use: Yes     Alcohol/week: 4.0 standard drinks of alcohol     Types: 4 Cans of beer per week     Comment: occ    Drug use: Not Currently     Frequency: 7.0 times per week     Types: Marijuana    Sexual activity: Yes     Partners: Female     Birth control/protection: Condom     Social Drivers of Health     Financial Resource  Strain: Low Risk  (9/11/2023)    Overall Financial Resource Strain (CARDIA)     Difficulty of Paying Living Expenses: Not hard at all   Food Insecurity: No Food Insecurity (9/11/2023)    Hunger Vital Sign     Worried About Running Out of Food in the Last Year: Never true     Ran Out of Food in the Last Year: Never true   Transportation Needs: No Transportation Needs (9/11/2023)    PRAPARE - Transportation     Lack of Transportation (Medical): No     Lack of Transportation (Non-Medical): No   Physical Activity: Sufficiently Active (9/11/2023)    Exercise Vital Sign     Days of Exercise per Week: 4 days     Minutes of Exercise per Session: 80 min   Stress: No Stress Concern Present (9/11/2023)    [employer] [place] [employer] - Occupational Stress Questionnaire     Feeling of Stress : Not at all   Housing Stability: Low Risk  (9/11/2023)    Housing Stability Vital Sign     Unable to Pay for Housing in the Last Year: No     Number of Places Lived in the Last Year: 1     Unstable Housing in the Last Year: No     Medication List with Changes/Refills   Current Medications    CETIRIZINE (ZYRTEC) 10 MG TABLET    Take 1 tablet (10 mg total) by mouth once daily. For Allergies    IBUPROFEN (ADVIL,MOTRIN) 800 MG TABLET    Take 800 mg by mouth every 6 (six) hours as needed.     Review of patient's allergies indicates:   Allergen Reactions    Amoxicillin Anaphylaxis    Shellfish containing products      Other reaction(s): swelling       Physical Exam:  Right Lower extremity:  Palpable bony prominence over anterior aspect of distal tibia.  No associated overlying skin changes.  No open wounds, abrasions, effusions  No TTP over bony prominence  Motor intact: EHL/FHL/TA/GSC  SILT: DP/SP/T/Cotter/Sa  Full ROM of hip, knee, ankle  Palpable DP pulse    Imaging independently interpreted:  MRI right tibia/fibula without contrast:  4.5 cm x 2.5 cm parosteal mass over the distal tibia with associated cortical remodeling, periosteal  reaction and trabecular edema.  Bony lesion involves the lateral cortex of the distal tibia.     Assessment and Plan:    Saad Weldon Jr. is a 24 y.o. male with right distal tibia bony lesion.    -referral to Dr. Arreola orthopedic oncologist provided today for further evaluation.  Notified DONELL for Dr. Arreola patient.    -no activity restrictions    -follow up p.r.n.      Yvonne Duran MD PGY-3  [employer] Orthopaedic Surgery           Orders Placed This Encounter    Ambulatory referral/consult to Orthopedics

## 2024-10-22 ENCOUNTER — TELEPHONE (OUTPATIENT)
Dept: INTERNAL MEDICINE | Facility: CLINIC | Age: 24
End: 2024-10-22
Payer: COMMERCIAL

## 2024-10-22 ENCOUNTER — TELEPHONE (OUTPATIENT)
Dept: ORTHOPEDICS | Facility: CLINIC | Age: 24
End: 2024-10-22
Payer: COMMERCIAL

## 2024-10-22 NOTE — TELEPHONE ENCOUNTER
----- Message from Chayo sent at 10/22/2024  8:34 AM CDT -----  Regarding: crutches  Who Called: Saad Weldon Jr.    Caller is requesting assistance/information from provider's office.    Symptoms (please be specific):      How long has patient had these symptoms:      List of preferred pharmacies on file (remove unneeded): [unfilled]  If different, enter pharmacy into here including location and phone number:         Preferred Method of Contact: Phone Call  Patient's Preferred Phone Number on File: [phone]   Best Call Back Number, if different:  Additional Information: Pt is requesting that an order be sent to the pharmacy for Crutches; please advise. Thanks.

## 2024-10-22 NOTE — TELEPHONE ENCOUNTER
Called pt for further discussion. Pt stated he has a tumor in R leg and needs crutches so he can stop putting weight on leg. Pt stated he had an MRI done on 10/17/24. I did advise [facility] for urgent request for crutches. Pts LOV was 9/11/2023. Please advise.

## 2024-10-22 NOTE — TELEPHONE ENCOUNTER
----- Message from Jacqueline sent at 10/22/2024  8:09 AM CDT -----  Regarding: Answering Service Message  Good Morning,      Patient left message with the answering service: He saw Ortho on yesterday.        -Oct-24 04:23p TAKEN  CallType: Patient Call  To: Office in Am   From: Saad Weldon   Phone: [phone]   Patient name: Same   : 2000   Ref: Was just seen in the office today and forgot to ask if he could get crutches to help keep him off his foot   [facility] ID: [id number]         Thanks,  Jacqueline    Spoke with Mr. Weldon informed him that he can come by Ortho clinic and  a pair crutches office hours are 7am -4pm.

## 2024-10-22 NOTE — TELEPHONE ENCOUNTER
See note from 10/1/24  Spoke to pt . Informed him of PCP's message below. Pt verbalized understanding. Offered pt to schedule appt with PCP , Pt stated he will return call to [facility] to schedule .         Advise patient that he again can call the provider who ordered the MRI for follow up as he has not seen me in over one year and is not wanting to schedule an appointment.     Thanks,    NAHID Atkins

## 2024-10-23 NOTE — TELEPHONE ENCOUNTER
Called pt and relayed PCP's message below. Pt verbalized understanding with no further questions or concerns at this time.

## 2024-10-26 NOTE — PROGRESS NOTES
Faculty Attestation: Saad Weldon Jr.  was seen at [facility] in the Orthopaedic Clinic. Discussed with the resident at the time of the visit. History of Present Illness, Physical Exam, and Assessment and Plan reviewed. Treatment plan is reasonable and appropriate. Compliance with treatment recommendations is important. No procedure was performed.     Tirso Dukes MD  Orthopaedic Surgery

## 2025-01-08 ENCOUNTER — OFFICE VISIT (OUTPATIENT)
Dept: URGENT CARE | Facility: CLINIC | Age: 25
End: 2025-01-08
Payer: COMMERCIAL

## 2025-01-08 VITALS
HEIGHT: 68 IN | HEART RATE: 100 BPM | RESPIRATION RATE: 18 BRPM | BODY MASS INDEX: 30.28 KG/M2 | TEMPERATURE: 100 F | SYSTOLIC BLOOD PRESSURE: 114 MMHG | OXYGEN SATURATION: 97 % | WEIGHT: 199.81 LBS | DIASTOLIC BLOOD PRESSURE: 73 MMHG

## 2025-01-08 DIAGNOSIS — J06.9 UPPER RESPIRATORY TRACT INFECTION, UNSPECIFIED TYPE: Primary | ICD-10-CM

## 2025-01-08 DIAGNOSIS — R09.89 SYMPTOMS OF UPPER RESPIRATORY INFECTION (URI): ICD-10-CM

## 2025-01-08 LAB
B PERT.PT PRMT NPH QL NAA+NON-PROBE: NOT DETECTED
C PNEUM DNA NPH QL NAA+NON-PROBE: NOT DETECTED
CTP QC/QA: YES
HADV DNA NPH QL NAA+NON-PROBE: NOT DETECTED
HCOV 229E RNA NPH QL NAA+NON-PROBE: NOT DETECTED
HCOV HKU1 RNA NPH QL NAA+NON-PROBE: NOT DETECTED
HCOV NL63 RNA NPH QL NAA+NON-PROBE: NOT DETECTED
HCOV OC43 RNA NPH QL NAA+NON-PROBE: NOT DETECTED
HMPV RNA NPH QL NAA+NON-PROBE: NOT DETECTED
HPIV1 RNA NPH QL NAA+NON-PROBE: NOT DETECTED
HPIV2 RNA NPH QL NAA+NON-PROBE: NOT DETECTED
HPIV3 RNA NPH QL NAA+NON-PROBE: NOT DETECTED
HPIV4 RNA NPH QL NAA+NON-PROBE: NOT DETECTED
M PNEUMO DNA NPH QL NAA+NON-PROBE: NOT DETECTED
MOLECULAR STREP A: NEGATIVE
POC MOLECULAR INFLUENZA A AGN: NEGATIVE
POC MOLECULAR INFLUENZA B AGN: NEGATIVE
RSV RNA NPH QL NAA+NON-PROBE: NOT DETECTED
RV+EV RNA NPH QL NAA+NON-PROBE: NOT DETECTED
SARS-COV-2 AG RESP QL IA.RAPID: NEGATIVE

## 2025-01-08 PROCEDURE — 87581 M.PNEUMON DNA AMP PROBE: CPT | Performed by: NURSE PRACTITIONER

## 2025-01-08 PROCEDURE — 99213 OFFICE O/P EST LOW 20 MIN: CPT | Mod: S$PBB,,, | Performed by: NURSE PRACTITIONER

## 2025-01-08 PROCEDURE — 99214 OFFICE O/P EST MOD 30 MIN: CPT | Mod: PBBFAC | Performed by: NURSE PRACTITIONER

## 2025-01-08 PROCEDURE — 87502 INFLUENZA DNA AMP PROBE: CPT | Mod: PBBFAC | Performed by: NURSE PRACTITIONER

## 2025-01-08 PROCEDURE — 87811 SARS-COV-2 COVID19 W/OPTIC: CPT | Mod: PBBFAC | Performed by: NURSE PRACTITIONER

## 2025-01-08 PROCEDURE — 87651 STREP A DNA AMP PROBE: CPT | Mod: PBBFAC | Performed by: NURSE PRACTITIONER

## 2025-01-08 RX ORDER — PROMETHAZINE HYDROCHLORIDE AND DEXTROMETHORPHAN HYDROBROMIDE 6.25; 15 MG/5ML; MG/5ML
10 SYRUP ORAL
Qty: 120 ML | Refills: 0 | Status: SHIPPED | OUTPATIENT
Start: 2025-01-08

## 2025-01-08 RX ORDER — FLUTICASONE PROPIONATE 50 MCG
2 SPRAY, SUSPENSION (ML) NASAL DAILY
Qty: 18.2 ML | Refills: 0 | Status: SHIPPED | OUTPATIENT
Start: 2025-01-08

## 2025-01-08 RX ORDER — ALBUTEROL SULFATE 90 UG/1
2 INHALANT RESPIRATORY (INHALATION) EVERY 6 HOURS PRN
Qty: 1 G | Refills: 0 | Status: SHIPPED | OUTPATIENT
Start: 2025-01-08

## 2025-01-08 NOTE — LETTER
January 8, 2025      [facility]  [address] [place] [id number]  Phone: [phone]       Patient: Saad Weldon   YOB: 2000  Date of Visit: 01/08/2025    To Whom It May Concern:    [place] Weldon  was at [facility] on 01/08/2025. The patient may return to work/school on 01/09/25 with no restrictions. If you have any questions or concerns, or if I can be of further assistance, please do not hesitate to contact me.    Sincerely,    MALACHI Adan

## 2025-01-09 NOTE — PROGRESS NOTES
"  Subjective:      Patient ID: Saad Weldon Jr. is a 24 y.o. male.    Vitals:  height is 5' 8" (1.727 m) and weight is 90.6 kg (199 lb 12.8 oz). His oral temperature is 99.5 °F (37.5 °C). His blood pressure is 114/73 and his pulse is 100. His respiration is 18 and oxygen saturation is 97%.     Chief Complaint: URI (Body aches, fever (102 at home), HA x today. )    URI      Pt presents with sore throat, fever, body aches, cough, headache and fever of 102, with intermittent nausea that as subsided today. Pt reports taking OTC medicine for headache and body pain with some relief. Pt denies shortness for breath or chest pain, dizziness, weakness, stomach pain, nausea or vomiting.  ROS   Objective:     Physical Exam   Constitutional: He is oriented to person, place, and time. He appears well-developed. He is cooperative.  Non-toxic appearance. He does not appear ill. No distress. awake  HENT:   Head: Normocephalic and atraumatic.   Ears:   Right Ear: Tympanic membrane normal. No no drainage, swelling or tenderness. Tympanic membrane is not injected, not erythematous, not retracted and not bulging. No middle ear effusion.   Left Ear: Tympanic membrane normal. No no drainage, swelling or tenderness. Tympanic membrane is not injected, not erythematous, not retracted and not bulging.  No middle ear effusion.   Nose: Rhinorrhea and congestion present. No purulent discharge. Right sinus exhibits no maxillary sinus tenderness and no frontal sinus tenderness. Left sinus exhibits no maxillary sinus tenderness and no frontal sinus tenderness.   Mouth/Throat: Uvula is midline and mucous membranes are normal. Mucous membranes are moist. Posterior oropharyngeal erythema present. No oropharyngeal exudate or cobblestoning.   Eyes: Conjunctivae are normal. Right eye exhibits no discharge. Left eye exhibits no discharge. Extraocular movement intact   Neck: Neck supple. No neck rigidity present.   Cardiovascular: Regular rhythm. "   Pulmonary/Chest: Effort normal and breath sounds normal. No stridor. No respiratory distress. He has no wheezes. He has no rhonchi. He has no rales. He exhibits no tenderness.   Abdominal: Normal appearance and bowel sounds are normal. Soft.   Lymphadenopathy:     He has no cervical adenopathy.   Neurological: no focal deficit. He is alert and oriented to person, place, and time.   Skin: Skin is warm, dry and not diaphoretic. Capillary refill takes less than 2 seconds.   Psychiatric: His behavior is normal. Mood, judgment and thought content normal.   Nursing note and vitals reviewed.      Assessment:     1. Upper respiratory tract infection, unspecified type    2. Symptoms of upper respiratory infection (URI)      Results for orders placed or performed in visit on 01/08/25   POCT Influenza A/B Molecular    Collection Time: 01/08/25  8:01 PM   Result Value Ref Range    POC Molecular Influenza A Ag Negative Negative    POC Molecular Influenza B Ag Negative Negative     Acceptable Yes    SARS Coronavirus 2 Antigen, POCT Manual Read    Collection Time: 01/08/25  8:02 PM   Result Value Ref Range    SARS Coronavirus 2 Antigen Negative Negative     Acceptable Yes    POCT Strep A, Molecular    Collection Time: 01/08/25  8:02 PM   Result Value Ref Range    Molecular Strep A, POC Negative Negative     Acceptable Yes          Plan:   ER precautions given and discussed  -nasal saline lavage  - OTC cold/flu products as desired for symptoms  - Plenty of fluids  - Humidified air  - Tylenol or Motrin for pain/fever  Upper respiratory tract infection, unspecified type  -     Respiratory Panel  -     promethazine-dextromethorphan (PROMETHAZINE-DM) 6.25-15 mg/5 mL Syrp; Take 10 mLs by mouth every 6 to 8 hours as needed (Cough). May cause sedation.  Do not drive or operate heavy machinery after taking this medication.  Dispense: 120 mL; Refill: 0  -     albuterol (PROVENTIL HFA) 90  mcg/actuation inhaler; Inhale 2 puffs into the lungs every 6 (six) hours as needed for Wheezing or Shortness of Breath. Rescue  Dispense: 1 g; Refill: 0  -     fluticasone propionate (FLONASE) 50 mcg/actuation nasal spray; 2 sprays (100 mcg total) by Each Nostril route once daily.  Dispense: 18.2 mL; Refill: 0    Symptoms of upper respiratory infection (URI)  -     SARS Coronavirus 2 Antigen, POCT Manual Read  -     POCT Influenza A/B Molecular  -     POCT Strep A, Molecular

## 2025-04-16 ENCOUNTER — HOSPITAL ENCOUNTER (EMERGENCY)
Facility: HOSPITAL | Age: 25
Discharge: HOME | End: 2025-04-16
Attending: STUDENT IN AN ORGANIZED HEALTH CARE EDUCATION/TRAINING PROGRAM
Payer: COMMERCIAL

## 2025-04-16 VITALS
HEIGHT: 68 IN | BODY MASS INDEX: 28.79 KG/M2 | TEMPERATURE: 98 F | OXYGEN SATURATION: 99 % | RESPIRATION RATE: 16 BRPM | WEIGHT: 190 LBS | HEART RATE: 55 BPM | DIASTOLIC BLOOD PRESSURE: 64 MMHG | SYSTOLIC BLOOD PRESSURE: 133 MMHG

## 2025-04-16 DIAGNOSIS — S01.81XA LACERATION OF FOREHEAD, INITIAL ENCOUNTER: Primary | ICD-10-CM
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
## 2025-04-16 LAB
ALBUMIN SERPL-MCNC: 4.4 G/DL (ref 3.5–5)
ALBUMIN/GLOB SERPL: 1.2 RATIO (ref 1.1–2)
ALP SERPL-CCNC: 123 UNIT/L (ref 40–150)
ALT SERPL-CCNC: 12 UNIT/L (ref 0–55)
ANION GAP SERPL CALC-SCNC: 9 MEQ/L
AST SERPL-CCNC: 18 UNIT/L (ref 11–45)
BASOPHILS # BLD AUTO: 0.06 X10(3)/MCL
BASOPHILS NFR BLD AUTO: 0.3 %
BILIRUB SERPL-MCNC: 1.3 MG/DL
BUN SERPL-MCNC: 18 MG/DL (ref 8.9–20.6)
CALCIUM SERPL-MCNC: 9.1 MG/DL (ref 8.4–10.2)
CHLORIDE SERPL-SCNC: 104 MMOL/L (ref 98–107)
CO2 SERPL-SCNC: 25 MMOL/L (ref 22–29)
CREAT SERPL-MCNC: 1.15 MG/DL (ref 0.72–1.25)
CREAT/UREA NIT SERPL: 16
EOSINOPHIL # BLD AUTO: 0.16 X10(3)/MCL (ref 0–0.9)
EOSINOPHIL NFR BLD AUTO: 0.8 %
ERYTHROCYTE [DISTWIDTH] IN BLOOD BY AUTOMATED COUNT: 12.5 % (ref 11.5–17)
GFR SERPLBLD CREATININE-BSD FMLA CKD-EPI: >60 ML/MIN/1.73/M2
GLOBULIN SER-MCNC: 3.6 GM/DL (ref 2.4–3.5)
GLUCOSE SERPL-MCNC: 130 MG/DL (ref 74–100)
HCT VFR BLD AUTO: 44 % (ref 42–52)
HGB BLD-MCNC: 15.2 G/DL (ref 14–18)
IMM GRANULOCYTES # BLD AUTO: 0.07 X10(3)/MCL (ref 0–0.04)
IMM GRANULOCYTES NFR BLD AUTO: 0.4 %
LYMPHOCYTES # BLD AUTO: 0.86 X10(3)/MCL (ref 0.6–4.6)
LYMPHOCYTES NFR BLD AUTO: 4.4 %
MCH RBC QN AUTO: 30 PG (ref 27–31)
MCHC RBC AUTO-ENTMCNC: 34.5 G/DL (ref 33–36)
MCV RBC AUTO: 86.8 FL (ref 80–94)
MONOCYTES # BLD AUTO: 1.13 X10(3)/MCL (ref 0.1–1.3)
MONOCYTES NFR BLD AUTO: 5.8 %
NEUTROPHILS # BLD AUTO: 17.1 X10(3)/MCL (ref 2.1–9.2)
NEUTROPHILS NFR BLD AUTO: 88.3 %
NRBC BLD AUTO-RTO: 0 %
PLATELET # BLD AUTO: 267 X10(3)/MCL (ref 130–400)
PMV BLD AUTO: 10.2 FL (ref 7.4–10.4)
POTASSIUM SERPL-SCNC: 3.8 MMOL/L (ref 3.5–5.1)
PROT SERPL-MCNC: 8 GM/DL (ref 6.4–8.3)
RBC # BLD AUTO: 5.07 X10(6)/MCL (ref 4.7–6.1)
SODIUM SERPL-SCNC: 138 MMOL/L (ref 136–145)
WBC # BLD AUTO: 19.38 X10(3)/MCL (ref 4.5–11.5)

## 2025-04-16 PROCEDURE — 63600175 PHARM REV CODE 636 W HCPCS: Performed by: PHYSICIAN ASSISTANT

## 2025-04-16 PROCEDURE — 99285 EMERGENCY DEPT VISIT HI MDM: CPT | Mod: 25

## 2025-04-16 PROCEDURE — 85025 COMPLETE CBC W/AUTO DIFF WBC: CPT | Performed by: PHYSICIAN ASSISTANT

## 2025-04-16 PROCEDURE — 25500020 PHARM REV CODE 255: Performed by: PHYSICIAN ASSISTANT

## 2025-04-16 PROCEDURE — 90715 TDAP VACCINE 7 YRS/> IM: CPT | Performed by: NURSE PRACTITIONER

## 2025-04-16 PROCEDURE — 63600175 PHARM REV CODE 636 W HCPCS: Performed by: NURSE PRACTITIONER

## 2025-04-16 PROCEDURE — 90471 IMMUNIZATION ADMIN: CPT | Performed by: NURSE PRACTITIONER

## 2025-04-16 PROCEDURE — 80053 COMPREHEN METABOLIC PANEL: CPT | Performed by: PHYSICIAN ASSISTANT

## 2025-04-16 PROCEDURE — 96374 THER/PROPH/DIAG INJ IV PUSH: CPT

## 2025-04-16 PROCEDURE — 96375 TX/PRO/DX INJ NEW DRUG ADDON: CPT

## 2025-04-16 PROCEDURE — 12013 RPR F/E/E/N/L/M 2.6-5.0 CM: CPT

## 2025-04-16 RX ORDER — SULFAMETHOXAZOLE AND TRIMETHOPRIM 800; 160 MG/1; MG/1
1 TABLET ORAL 2 TIMES DAILY
Qty: 14 TABLET | Refills: 0 | Status: SHIPPED | OUTPATIENT
Start: 2025-04-16 | End: 2025-04-23

## 2025-04-16 RX ORDER — DIPHENHYDRAMINE HYDROCHLORIDE 50 MG/ML
25 INJECTION, SOLUTION INTRAMUSCULAR; INTRAVENOUS
Status: COMPLETED | OUTPATIENT
Start: 2025-04-16 | End: 2025-04-16

## 2025-04-16 RX ORDER — KETOROLAC TROMETHAMINE 30 MG/ML
30 INJECTION, SOLUTION INTRAMUSCULAR; INTRAVENOUS
Status: COMPLETED | OUTPATIENT
Start: 2025-04-16 | End: 2025-04-16

## 2025-04-16 RX ORDER — HYDROCODONE BITARTRATE AND ACETAMINOPHEN 7.5; 325 MG/1; MG/1
1 TABLET ORAL EVERY 6 HOURS PRN
Qty: 15 TABLET | Refills: 0 | Status: SHIPPED | OUTPATIENT
Start: 2025-04-16

## 2025-04-16 RX ORDER — CYCLOBENZAPRINE HCL 5 MG
5 TABLET ORAL 3 TIMES DAILY PRN
Qty: 15 TABLET | Refills: 0 | Status: SHIPPED | OUTPATIENT
Start: 2025-04-16

## 2025-04-16 RX ORDER — LIDOCAINE HYDROCHLORIDE 10 MG/ML
5 INJECTION, SOLUTION INFILTRATION; PERINEURAL
Status: DISCONTINUED | OUTPATIENT
Start: 2025-04-16 | End: 2025-04-16 | Stop reason: HOSPADM

## 2025-04-16 RX ADMIN — CLOSTRIDIUM TETANI TOXOID ANTIGEN (FORMALDEHYDE INACTIVATED), CORYNEBACTERIUM DIPHTHERIAE TOXOID ANTIGEN (FORMALDEHYDE INACTIVATED), BORDETELLA PERTUSSIS TOXOID ANTIGEN (GLUTARALDEHYDE INACTIVATED), BORDETELLA PERTUSSIS FILAMENTOUS HEMAGGLUTININ ANTIGEN (FORMALDEHYDE INACTIVATED), BORDETELLA PERTUSSIS PERTACTIN ANTIGEN, AND BORDETELLA PERTUSSIS FIMBRIAE 2/3 ANTIGEN 0.5 ML: 5; 2; 2.5; 5; 3; 5 INJECTION, SUSPENSION INTRAMUSCULAR at 04:04

## 2025-04-16 RX ADMIN — IOHEXOL 100 ML: 350 INJECTION, SOLUTION INTRAVENOUS at 02:04

## 2025-04-16 RX ADMIN — KETOROLAC TROMETHAMINE 30 MG: 30 INJECTION, SOLUTION INTRAMUSCULAR; INTRAVENOUS at 04:04

## 2025-04-16 RX ADMIN — DIPHENHYDRAMINE HYDROCHLORIDE 25 MG: 50 INJECTION INTRAMUSCULAR; INTRAVENOUS at 02:04

## 2025-04-16 NOTE — Clinical Note
"Saad Razoadriane Weldon was seen and treated in our emergency department on 4/16/2025.  He may return to work on 04/21/2025.       If you have any questions or concerns, please don't hesitate to call.      Anne Crawford, ANHID"
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
## 2025-04-24 ENCOUNTER — HOSPITAL ENCOUNTER (EMERGENCY)
Facility: HOSPITAL | Age: 25
Discharge: HOME | End: 2025-04-24
Attending: STUDENT IN AN ORGANIZED HEALTH CARE EDUCATION/TRAINING PROGRAM
Payer: COMMERCIAL

## 2025-04-24 VITALS
TEMPERATURE: 99 F | HEART RATE: 69 BPM | BODY MASS INDEX: 27.99 KG/M2 | HEIGHT: 69 IN | RESPIRATION RATE: 16 BRPM | DIASTOLIC BLOOD PRESSURE: 76 MMHG | SYSTOLIC BLOOD PRESSURE: 128 MMHG | OXYGEN SATURATION: 97 % | WEIGHT: 189 LBS

## 2025-04-24 DIAGNOSIS — Z48.02 VISIT FOR SUTURE REMOVAL: Primary | ICD-10-CM

## 2025-04-24 DIAGNOSIS — G44.309 POST-CONCUSSION HEADACHE: ICD-10-CM

## 2025-04-24 PROCEDURE — 25000003 PHARM REV CODE 250

## 2025-04-24 PROCEDURE — 99284 EMERGENCY DEPT VISIT MOD MDM: CPT

## 2025-04-24 RX ORDER — BUTALBITAL, ACETAMINOPHEN AND CAFFEINE 50; 325; 40 MG/1; MG/1; MG/1
1 TABLET ORAL EVERY 8 HOURS PRN
Qty: 15 TABLET | Refills: 0 | Status: SHIPPED | OUTPATIENT
Start: 2025-04-24 | End: 2025-04-29

## 2025-04-24 RX ORDER — BUTALBITAL, ACETAMINOPHEN AND CAFFEINE 50; 325; 40 MG/1; MG/1; MG/1
1 TABLET ORAL
Status: COMPLETED | OUTPATIENT
Start: 2025-04-24 | End: 2025-04-24

## 2025-04-24 RX ORDER — BUTALBITAL, ACETAMINOPHEN AND CAFFEINE 50; 325; 40 MG/1; MG/1; MG/1
1 TABLET ORAL EVERY 8 HOURS PRN
Qty: 15 TABLET | Refills: 0 | Status: SHIPPED | OUTPATIENT
Start: 2025-04-24 | End: 2025-04-24

## 2025-04-24 RX ADMIN — BUTALBITAL, ACETAMINOPHEN, AND CAFFEINE 1 TABLET: 325; 50; 40 TABLET ORAL at 12:04

## 2025-04-24 NOTE — ED PROVIDER NOTES
Encounter Date: 4/24/2025       History     Chief Complaint   Patient presents with    Suture / Staple Removal     Patient reports here for suture removal to left eyebrow. Also endorses headache since accident. Denies new trauma.     See MDM    The history is provided by the patient.     Review of patient's allergies indicates:   Allergen Reactions    Amoxicillin Anaphylaxis    Shellfish containing products      Other reaction(s): swelling     Past Medical History:   Diagnosis Date    Depression      Past Surgical History:   Procedure Laterality Date    TONSILLECTOMY       Family History   Problem Relation Name Age of Onset    No Known Problems Mother      No Known Problems Father       Social History[1]  Review of Systems   Constitutional:  Negative for chills and fever.   Respiratory:  Negative for chest tightness and shortness of breath.    Cardiovascular:  Negative for chest pain and palpitations.   Gastrointestinal:  Negative for abdominal pain, diarrhea and vomiting.   Genitourinary:  Negative for dysuria and frequency.   Musculoskeletal:  Negative for arthralgias and myalgias.   Skin:  Positive for wound.        Laceration on forehead closed with sutures   Neurological:  Positive for headaches. Negative for dizziness and syncope.   All other systems reviewed and are negative.      Physical Exam     Initial Vitals [04/24/25 1149]   BP Pulse Resp Temp SpO2   128/76 69 16 98.5 °F (36.9 °C) 97 %      MAP       --         Physical Exam    Vitals reviewed.  Constitutional: He appears well-developed and well-nourished.   HENT:   Head: Head is with laceration.       12 sutures from left eyebrow to forehead. No redness or drainage noted.    Cardiovascular:  Normal rate and regular rhythm.           Pulmonary/Chest: Breath sounds normal.   Musculoskeletal:         General: Normal range of motion.     Neurological: He is alert and oriented to person, place, and time.   Skin: Skin is warm and dry.   Psychiatric: He has a  normal mood and affect.         ED Course   Procedures  Labs Reviewed - No data to display       Imaging Results    None          Medications   butalbital-acetaminophen-caffeine -40 mg per tablet 1 tablet (1 tablet Oral Given 4/24/25 1200)     Medical Decision Making  25 year old male presents for suture removal. He had sutures placed for forehead laceration on 4/16. He also complains of a headache since the accident. Head, neck, and maxillofacial CT done and negative. Has not tried anything at home for the pain. Has norco prescribed for various but does not want to take. Mom also voices concern for mental health issues/PTSD. Denies SI/HI, just looking for resources.    Pt has no new trauma or injuries. Discussed post concussive syndrome with pt, offered repeat CT and pt declined.    RN removed sutures with no complications. Gave patient resources for psychiatry. Will send home with ort for head pain and post concussive syndrome instructions.    Discussed follow up and ED return precautions. Pt verbalized understanding. All questions answered      Risk  Prescription drug management.                                      Clinical Impression:  Final diagnoses:  [Z48.02] Visit for suture removal (Primary)  [G44.309] Post-concussion headache          ED Disposition Condition    Discharge Stable          ED Prescriptions       Medication Sig Dispense Start Date End Date Auth. Provider    butalbital-acetaminophen-caffeine -40 mg (FIORICET, ESGIC) -40 mg per tablet  (Status: Discontinued) Take 1 tablet by mouth every 8 (eight) hours as needed for Pain (Headaches). 15 tablet 4/24/2025 4/24/2025 Jami Carrington PA-C    butalbital-acetaminophen-caffeine -40 mg (FIORICET, ESGIC) -40 mg per tablet Take 1 tablet by mouth every 8 (eight) hours as needed for Pain (Headaches). 15 tablet 4/24/2025 4/29/2025 Jami Carrington PA-C          Follow-up Information       Follow up With Specialties Details  Why Contact Info    Cm Hollis III, MD Internal Medicine   [address] [place] [id number]  [phone]                   [1]   Social History  Tobacco Use    Smoking status: Former     Types: Vaping with nicotine    Smokeless tobacco: Never   Substance Use Topics    Alcohol use: Yes     Alcohol/week: 4.0 standard drinks of alcohol     Types: 4 Cans of beer per week     Comment: occ    Drug use: Not Currently     Frequency: 7.0 times per week     Types: Marijuana        Jami Carrington PA-C  04/24/25 2210

## 2025-04-24 NOTE — DISCHARGE INSTRUCTIONS
Take fiorcet every 8 hours as needed for headache.    Limit screen time to 1-2 hours at a time    Follow up with primary care and psychiatry.   Home

## 2025-04-29 PROBLEM — J45.20 MILD INTERMITTENT ASTHMA WITHOUT COMPLICATION: Status: ACTIVE | Noted: 2025-04-29

## 2025-04-29 PROBLEM — R22.40 LEG MASS: Status: ACTIVE | Noted: 2025-04-29

## 2025-04-29 PROBLEM — D48.0 NEOPLASM OF UNCERTAIN BEHAVIOR OF BONE AND ARTICULAR CARTILAGE: Status: ACTIVE | Noted: 2024-10-31

## 2025-04-30 PROBLEM — S13.4XXA WHIPLASH INJURY TO NECK: Status: ACTIVE | Noted: 2025-04-30
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
